# Patient Record
Sex: MALE | Race: WHITE
[De-identification: names, ages, dates, MRNs, and addresses within clinical notes are randomized per-mention and may not be internally consistent; named-entity substitution may affect disease eponyms.]

---

## 2020-09-29 ENCOUNTER — HOSPITAL ENCOUNTER (EMERGENCY)
Dept: HOSPITAL 63 - ER | Age: 35
Discharge: HOME | End: 2020-09-29
Payer: COMMERCIAL

## 2020-09-29 VITALS
DIASTOLIC BLOOD PRESSURE: 79 MMHG | WEIGHT: 214.73 LBS | HEIGHT: 66 IN | SYSTOLIC BLOOD PRESSURE: 137 MMHG | BODY MASS INDEX: 34.51 KG/M2
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
## 2020-09-29 DIAGNOSIS — S60.212A: Primary | ICD-10-CM
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
## 2020-09-29 PROCEDURE — 90471 IMMUNIZATION ADMIN: CPT

## 2020-09-29 PROCEDURE — 90715 TDAP VACCINE 7 YRS/> IM: CPT

## 2020-09-29 PROCEDURE — 99283 EMERGENCY DEPT VISIT LOW MDM: CPT

## 2020-09-29 PROCEDURE — 73110 X-RAY EXAM OF WRIST: CPT

## 2020-09-29 NOTE — PHYS DOC
Past History


Past Medical History:  No Pertinent History


Past Surgical History:  No Surgical History


Alcohol Use:  Occasionally





General Adult


EDM:


Chief Complaint:  WRIST PAIN





HPI:


HPI:


34-year-old male presents to the ED with complaints of left wrist pain over the 

dorsal aspect of his wrist that occurred around 1:30 AM after he was arrested 

and handcuffed.  Patient cannot recall exact mechanism of injury but states his 

handcuffs were on both wrists and were very tight.  States he did not resist 

arrest and cannot recall any specific blunt injury.  Is right-hand dominant.  No

associated restricted range of motion, severe pain, neurologic deficits or skin 

color changes.  Cannot recall if his tetanus is up-to-date.  Does admit to being

intoxicated.  Denies any associated head injury or loss of consciousness. NKDA-

takes no routine medications.





Review of Systems:


Review of Systems:


Constitutional:  Denies fever or chills 


Eyes:  Denies change in visual acuity 


HENT:  Denies nasal congestion or sore throat 


Respiratory:  Denies cough or shortness of breath or hemoptysis


Cardiovascular:  Denies chest pain or edema 


GI:  Denies abdominal pain, nausea, vomiting,  or diarrhea 


: Denies dysuria or hematuria


Musculoskeletal:  Denies back pain or joint pain 


Integument:  Denies rash 


Neurologic:  Denies headache, focal weakness or sensory changes or midline neck 

pain


Endocrine:  Denies polyuria or polydipsia 


Lymphatic:  Denies swollen glands 


Psychiatric:  Denies depression or anxiety, no homicidal or suicidal ideations





Heart Score:


Risk Factors:


Risk Factors:  DM, Current or recent (<one month) smoker, HTN, HLP, family 

history of CAD, obesity.


Risk Scores:


Score 0 - 3:  2.5% MACE over next 6 weeks - Discharge Home


Score 4 - 6:  20.3% MACE over next 6 weeks - Admit for Clinical Observation


Score 7 - 10:  72.7% MACE over next 6 weeks - Early Invasive Strategies





Current Medications:


Current Meds:





Current Medications








 Medications


  (Trade)  Dose


 Ordered  Sig/Willam  Start Time


 Stop Time Status Last Admin


Dose Admin


 


 Acetaminophen


  (Tylenol)  650 mg  1X  ONCE  20 06:15


 20 06:16 UNV  














Allergies:


Allergies:





Allergies








Coded Allergies Type Severity Reaction Last Updated Verified


 


  No Known Allergies Allergy Unknown  20 Yes











Physical Exam:


PE:





Constitutional: Well developed, well nourished, no acute distress, non-toxic 

appearance. [] Patient is clinically sober with steady gait, no ataxia or 

slurred speech


HENT: Normocephalic, atraumatic, bilateral external ears normal, oropharynx 

moist, nose normal. []


Eyes: PERRLA, EOMI, conjunctiva normal, no discharge. [] 


Neck: Normal range of motion, no tenderness, supple, no stridor. [] 


Cardiovascular:Heart rate regular rhythm, no murmur []


Lungs & Thorax:  Bilateral breath sounds clear to auscultation []


Abdomen:  soft, no tenderness, 


Skin: Warm, dry, no erythema, no rash. [] 


Back: No tenderness, 


Extremities: +proximal 6x6cm hematoma over dorsal wrist with no elbow/shoulder 

pain, equal radial pulses, no pain over scaphoid or lunate, no cyanosis, no 

clubbing, ROM intact, small superficial abrasion with dried blood over right 

thumb bt MCP and IP joint


Neurologic: Alert and oriented X 3, normal motor function, normal sensory 

function, no focal deficits noted. []


Psychologic: Affect normal, judgement normal, mood normal. []


 Nexus C-spine criteria are negative: There is no post midline tenderness, the 

patient is not clinically intoxicated (last drink was 6 hours ago), there is a 

normal level of alertness, there are no focal neurologic deficits and there are 

no distracting injuries.  Therefore the c-collar has been removed.





Current Patient Data:


Vital Signs:





                                   Vital Signs








  Date Time  Temp Pulse Resp B/P (MAP) Pulse Ox O2 Delivery O2 Flow Rate FiO2


 


20 05:51 98.4 95 16 137/79 (98) 97 Room Air  











EKG:


EKG:


[]





Radiology/Procedures:


Radiology/Procedures:


[]IMAGING REPORT





                                     Signed





PATIENT: EMANI GUZMAN LACCOUNT: [id number]     MRN#: [id number]


: 1985           LOCATION: ER              AGE: 34


SEX: M                    EXAM DT: 20         ACCESSION#: [id number]


STATUS: REG ER            ORD. PHYSICIAN: JAKE WOO DO


REASON: wrist hpain with hematoma


PROCEDURE: WRIST 3V LEFT





Left wrist 3 views:


 


Reason for examination: Wrist pain with hematoma.


 


No acute fracture or dislocation is seen. The bone density is normal. No 


abnormal periosteal reaction is seen. Joint spaces are maintained.


 


IMPRESSION:


 


No acute bony abnormality evident at the left wrist.


 


Electronically signed by: Berry Blanc MD (2020 6:36 AM) [facility]














DICTATED AND SIGNED BY:     BERRY BLANC MD


DATE:     20





CC: RONI JOHNSON; JAKE WOO DO ~





Course & Med Decision Making:


Course & Med Decision Making


Pertinent Labs and Imaging studies reviewed. (See chart for details)





Concern for left wrist pain with associated hematoma.  Imaging does not reveal 

any apparent fracture.  Will teat conservatively with rice and NSAIDs (offered 

splint, pt declnied).  Patient educated we cannot exclude an occult fracture and

 if pain should persist to repeat imaging and follow-up with orthopedic surgery.

  Strict ED return precautions were given for severe pain or neurologic 

deficits. Encouraged urgent outpatient follow-up with PMD and orthopedic 

surgery.  Life-threatening processes were considered but are low suspicion at 

this time, given history and physical exam. Pt was educated on all prescription 

medications and adverse effects.  All patient's questions were answered and pt 

was stable at time of discharge.





Differential includes fracture, dislocation, laceration, osteomyelitis, 

compartment syndrome, neurovascular injury or deficit, infection (abscess, 

cellulitis, septic arthritis), tendon or ligament injury.





I spoken with the patient and her caregivers.  I explained the patient's 

condition, diagnoses and treatment plan based on the information available to me

 at this time.  I have answered the patient and her caregiver's questions and 

addressed any concerns.  The patient and her caregivers have a good 

understanding of patient's diagnosis, condition and treatment plan as can be 

expected at this point.  Vital signs have been stable.  Patient's condition is 

stable and appropriate for discharge from the emergency department. 





Patient will pursue further outpatient evaluation with primary care physician or

 other designated or consulting physician as outlined in the discharge 

instructions.  The patient and/or caregivers are agreeable to this plan of care 

and follow-up instructions have been explained in detail.  The patient and/or 

caregivers have received these instructions in written form and have expressed 

an understanding of the discharge instructions.  The patient and/or caregivers 

are aware that any significant change of condition or worsening of symptoms 

should prompt immediate return to this or the closest emergency department or 

call to 911.





Alonzo Disclaimer:


Dragon Disclaimer:


This electronic medical record was generated, in whole or in part, using a voice

 recognition dictation system.





Departure


Departure:


Impression:  


   Primary Impression:  


   Wrist pain, left


   Additional Impressions:  


   Need for Tdap vaccination


   Traumatic hematoma of left wrist


Disposition:   HOME/RESIDENCE PRIOR TO ADM


Condition:  STABLE


Referrals:  


RONI JOHNSON (PCP)


Patient Instructions:  Hematoma, Wrist Pain





Additional Instructions:  


[facility]-in 1-2 weeks if pain persists


[address], [address]


[place] [address]


[phone]





EMERGENCY DEPARTMENT GENERAL DISCHARGE INSTRUCTIONS





Thank you for coming to [facility] (ED) today and trusting us

 with you 


care.  We trust that you had a positivie experience in our Emergency Department.

  If you 


wish to speak to the department management, you may call the director at 

[phone].





YOUR FOLLOW UP INSTRUCTIONS ARE AS FOLLOWS:





1.  Do you have a private Doctor?  If you do not have a private doctor, please 

ask for a 


resource list of physicians or clinics that may be able to assist you with 

follow up care.





2.  The Emergency Physician has interpreted your x-rays.  The X-Ray specialist 

will also 


review them.  If there is a change in the findings, you will be notified in 48 

hours when at 


all possible.





3.  A lab test or culture has been done, your results will be reviewed and you 

will be 


notified if you need a change in treatment.





ADDITIONAL INSTRUCTIONS AND INFORMATION:





1.  Your care today has been supervised by a physician who is specially trained 

in emergency 


care.  Many problems require more than one evaluation for a complete diagnosis 

and 


treatment.  We recommend that you schedule your follow up appointment as 

recommended to 


ensure complete treatment of you illness or injury.  If you are unable to obtain

 follow up 


care and continue to have a problem, or if your condition worsens, we recommend 

that you 


return to the ED.





2.  We are not able to safely determine your condition over the phone nor are we

 able to 


give sound medical advice over the phone.  For these safety reasons, if you call

 for medical 


advice we will ask you to come to the ED for further evaluation.





3.  If you have any questions regarding these discharge instructions please call

 the ED at 


[phone].





SAFETY INFORMATION:





In the interest of safety, wellness, and injury prevention; we encourage you to 

wear your 


sealbelt, if you smoke; quite smoking, and we encourage family to use a p

rotective helmet 


for bicycling and other sporting events that present an increased risk for head 

injury.





IF YOUR SYMPTOMS WORSEN OR NEW SYMPTOMS DEVELOP, OR YOU HAVE CONCERNS ABOUT YOUR

 CONDITION; 


OR IF YOUR CONDITION WORSENS WHILE YOU ARE WAITING FOR YOUR FOLLOW UP 

APPOINTMENT; EITHER 


CONTACT YOUR PRIMARY CARE DOCTOR, THE PHYSICIAN WHOSE NAME AND NUMBER YOU WERE 

GIVEN, OR 


RETURN TO THE ED IMMEDIATELY.


Scripts


Ibuprofen (IBUPROFEN) 600 Mg Tablet


600 MG PO Q6HRS for headache, #20 TAB


   Prov: JAKE WOO DO         20





Justification of Admission:


Justification of Admission:


Justification of Admission Dx:  N/A











JAKE WOO DO               Sep 29, 2020 06:41

## 2020-09-29 NOTE — RAD
Left wrist 3 views:

 

Reason for examination: Wrist pain with hematoma.

 

No acute fracture or dislocation is seen. The bone density is normal. No 

abnormal periosteal reaction is seen. Joint spaces are maintained.

 

IMPRESSION:

 

No acute bony abnormality evident at the left wrist.

 

Electronically signed by: Ashley Vasquez MD (9/29/2020 6:36 AM) [facility]